# Patient Record
Sex: MALE | Race: WHITE | NOT HISPANIC OR LATINO | URBAN - METROPOLITAN AREA
[De-identification: names, ages, dates, MRNs, and addresses within clinical notes are randomized per-mention and may not be internally consistent; named-entity substitution may affect disease eponyms.]

---

## 2024-01-14 ENCOUNTER — LAB REQUISITION (OUTPATIENT)
Dept: LAB | Facility: HOSPITAL | Age: 34
End: 2024-01-14
Payer: COMMERCIAL

## 2024-01-14 DIAGNOSIS — R07.0 PAIN IN THROAT: ICD-10-CM

## 2024-01-14 PROCEDURE — 87651 STREP A DNA AMP PROBE: CPT

## 2024-01-15 LAB — S PYO DNA THROAT QL NAA+PROBE: DETECTED

## 2025-06-21 ENCOUNTER — OFFICE VISIT (OUTPATIENT)
Dept: URGENT CARE | Age: 35
End: 2025-06-21
Payer: COMMERCIAL

## 2025-06-21 DIAGNOSIS — S61.011A LACERATION OF RIGHT THUMB WITHOUT DAMAGE TO NAIL, FOREIGN BODY PRESENCE UNSPECIFIED, INITIAL ENCOUNTER: Primary | ICD-10-CM

## 2025-06-21 NOTE — PROGRESS NOTES
Pt presents with laceration to thumb  Evaluated and copious amount of pleeding possible tendon injury advised ed